# Patient Record
Sex: MALE | Race: WHITE | NOT HISPANIC OR LATINO | Employment: FULL TIME | URBAN - NONMETROPOLITAN AREA
[De-identification: names, ages, dates, MRNs, and addresses within clinical notes are randomized per-mention and may not be internally consistent; named-entity substitution may affect disease eponyms.]

---

## 2019-03-02 ENCOUNTER — HOSPITAL ENCOUNTER (INPATIENT)
Facility: HOSPITAL | Age: 59
LOS: 1 days | Discharge: LEFT BEFORE DISCHARGE | DRG: 871 | End: 2019-03-03
Attending: EMERGENCY MEDICINE | Admitting: FAMILY MEDICINE
Payer: COMMERCIAL

## 2019-03-02 ENCOUNTER — APPOINTMENT (EMERGENCY)
Dept: RADIOLOGY | Facility: HOSPITAL | Age: 59
DRG: 871 | End: 2019-03-02
Payer: COMMERCIAL

## 2019-03-02 DIAGNOSIS — D69.6 THROMBOCYTOPENIA (HCC): ICD-10-CM

## 2019-03-02 DIAGNOSIS — R50.9 FEVER: ICD-10-CM

## 2019-03-02 DIAGNOSIS — J18.9 PNEUMONIA: Primary | ICD-10-CM

## 2019-03-02 DIAGNOSIS — R53.1 WEAKNESS: ICD-10-CM

## 2019-03-02 DIAGNOSIS — R19.7 DIARRHEA: ICD-10-CM

## 2019-03-02 DIAGNOSIS — J18.9 COMMUNITY ACQUIRED PNEUMONIA OF LEFT UPPER LOBE OF LUNG: ICD-10-CM

## 2019-03-02 PROBLEM — I10 ESSENTIAL HYPERTENSION: Status: ACTIVE | Noted: 2019-03-02

## 2019-03-02 PROBLEM — A41.9 SEPSIS (HCC): Status: ACTIVE | Noted: 2019-03-02

## 2019-03-02 PROBLEM — F17.200 SMOKER: Status: ACTIVE | Noted: 2019-03-02

## 2019-03-02 LAB
ALBUMIN SERPL BCP-MCNC: 3.2 G/DL (ref 3.5–5)
ALP SERPL-CCNC: 62 U/L (ref 46–116)
ALT SERPL W P-5'-P-CCNC: 32 U/L (ref 12–78)
ANION GAP SERPL CALCULATED.3IONS-SCNC: 13 MMOL/L (ref 4–13)
AST SERPL W P-5'-P-CCNC: 23 U/L (ref 5–45)
BASOPHILS # BLD AUTO: 0.01 THOUSANDS/ΜL (ref 0–0.1)
BASOPHILS NFR BLD AUTO: 0 % (ref 0–1)
BILIRUB SERPL-MCNC: 0.4 MG/DL (ref 0.2–1)
BILIRUB UR QL STRIP: NEGATIVE
BUN SERPL-MCNC: 12 MG/DL (ref 5–25)
CALCIUM SERPL-MCNC: 8.8 MG/DL (ref 8.3–10.1)
CHLORIDE SERPL-SCNC: 98 MMOL/L (ref 100–108)
CLARITY UR: CLEAR
CO2 SERPL-SCNC: 23 MMOL/L (ref 21–32)
COLOR UR: YELLOW
CREAT SERPL-MCNC: 0.92 MG/DL (ref 0.6–1.3)
EOSINOPHIL # BLD AUTO: 0 THOUSAND/ΜL (ref 0–0.61)
EOSINOPHIL NFR BLD AUTO: 0 % (ref 0–6)
ERYTHROCYTE [DISTWIDTH] IN BLOOD BY AUTOMATED COUNT: 12.3 % (ref 11.6–15.1)
FLUAV AG SPEC QL: NOT DETECTED
FLUBV AG SPEC QL: NOT DETECTED
GFR SERPL CREATININE-BSD FRML MDRD: 92 ML/MIN/1.73SQ M
GLUCOSE SERPL-MCNC: 126 MG/DL (ref 65–140)
GLUCOSE UR STRIP-MCNC: NEGATIVE MG/DL
HCT VFR BLD AUTO: 48 % (ref 36.5–49.3)
HGB BLD-MCNC: 16.4 G/DL (ref 12–17)
HGB UR QL STRIP.AUTO: NEGATIVE
IMM GRANULOCYTES # BLD AUTO: 0.02 THOUSAND/UL (ref 0–0.2)
IMM GRANULOCYTES NFR BLD AUTO: 0 % (ref 0–2)
KETONES UR STRIP-MCNC: NEGATIVE MG/DL
LACTATE SERPL-SCNC: 1 MMOL/L (ref 0.5–2)
LEUKOCYTE ESTERASE UR QL STRIP: NEGATIVE
LIPASE SERPL-CCNC: 214 U/L (ref 73–393)
LYMPHOCYTES # BLD AUTO: 0.71 THOUSANDS/ΜL (ref 0.6–4.47)
LYMPHOCYTES NFR BLD AUTO: 10 % (ref 14–44)
MAGNESIUM SERPL-MCNC: 2 MG/DL (ref 1.6–2.6)
MCH RBC QN AUTO: 30.3 PG (ref 26.8–34.3)
MCHC RBC AUTO-ENTMCNC: 34.2 G/DL (ref 31.4–37.4)
MCV RBC AUTO: 89 FL (ref 82–98)
MONOCYTES # BLD AUTO: 0.47 THOUSAND/ΜL (ref 0.17–1.22)
MONOCYTES NFR BLD AUTO: 7 % (ref 4–12)
NEUTROPHILS # BLD AUTO: 5.65 THOUSANDS/ΜL (ref 1.85–7.62)
NEUTS SEG NFR BLD AUTO: 83 % (ref 43–75)
NITRITE UR QL STRIP: NEGATIVE
NRBC BLD AUTO-RTO: 0 /100 WBCS
PH UR STRIP.AUTO: 6.5 [PH] (ref 4.5–8)
PLATELET # BLD AUTO: 110 THOUSANDS/UL (ref 149–390)
PLATELET # BLD AUTO: 123 THOUSANDS/UL (ref 149–390)
PMV BLD AUTO: 10.6 FL (ref 8.9–12.7)
PMV BLD AUTO: 10.9 FL (ref 8.9–12.7)
POTASSIUM SERPL-SCNC: 4 MMOL/L (ref 3.5–5.3)
PROT SERPL-MCNC: 7.2 G/DL (ref 6.4–8.2)
PROT UR STRIP-MCNC: NEGATIVE MG/DL
RBC # BLD AUTO: 5.41 MILLION/UL (ref 3.88–5.62)
RSV B RNA SPEC QL NAA+PROBE: NOT DETECTED
SODIUM SERPL-SCNC: 134 MMOL/L (ref 136–145)
SP GR UR STRIP.AUTO: 1.01 (ref 1–1.03)
TROPONIN I SERPL-MCNC: <0.02 NG/ML
UROBILINOGEN UR QL STRIP.AUTO: 0.2 E.U./DL
WBC # BLD AUTO: 6.86 THOUSAND/UL (ref 4.31–10.16)

## 2019-03-02 PROCEDURE — 36415 COLL VENOUS BLD VENIPUNCTURE: CPT | Performed by: EMERGENCY MEDICINE

## 2019-03-02 PROCEDURE — 99285 EMERGENCY DEPT VISIT HI MDM: CPT

## 2019-03-02 PROCEDURE — 85049 AUTOMATED PLATELET COUNT: CPT | Performed by: FAMILY MEDICINE

## 2019-03-02 PROCEDURE — 84484 ASSAY OF TROPONIN QUANT: CPT | Performed by: EMERGENCY MEDICINE

## 2019-03-02 PROCEDURE — 83735 ASSAY OF MAGNESIUM: CPT | Performed by: EMERGENCY MEDICINE

## 2019-03-02 PROCEDURE — 86738 MYCOPLASMA ANTIBODY: CPT | Performed by: EMERGENCY MEDICINE

## 2019-03-02 PROCEDURE — 94640 AIRWAY INHALATION TREATMENT: CPT

## 2019-03-02 PROCEDURE — 96365 THER/PROPH/DIAG IV INF INIT: CPT

## 2019-03-02 PROCEDURE — 85025 COMPLETE CBC W/AUTO DIFF WBC: CPT | Performed by: EMERGENCY MEDICINE

## 2019-03-02 PROCEDURE — 84145 PROCALCITONIN (PCT): CPT | Performed by: FAMILY MEDICINE

## 2019-03-02 PROCEDURE — 94664 DEMO&/EVAL PT USE INHALER: CPT

## 2019-03-02 PROCEDURE — 84484 ASSAY OF TROPONIN QUANT: CPT | Performed by: FAMILY MEDICINE

## 2019-03-02 PROCEDURE — 93005 ELECTROCARDIOGRAM TRACING: CPT

## 2019-03-02 PROCEDURE — 83690 ASSAY OF LIPASE: CPT | Performed by: EMERGENCY MEDICINE

## 2019-03-02 PROCEDURE — 87631 RESP VIRUS 3-5 TARGETS: CPT | Performed by: EMERGENCY MEDICINE

## 2019-03-02 PROCEDURE — 94760 N-INVAS EAR/PLS OXIMETRY 1: CPT

## 2019-03-02 PROCEDURE — 80053 COMPREHEN METABOLIC PANEL: CPT | Performed by: EMERGENCY MEDICINE

## 2019-03-02 PROCEDURE — 87040 BLOOD CULTURE FOR BACTERIA: CPT | Performed by: EMERGENCY MEDICINE

## 2019-03-02 PROCEDURE — 99222 1ST HOSP IP/OBS MODERATE 55: CPT | Performed by: FAMILY MEDICINE

## 2019-03-02 PROCEDURE — 71046 X-RAY EXAM CHEST 2 VIEWS: CPT

## 2019-03-02 PROCEDURE — 81003 URINALYSIS AUTO W/O SCOPE: CPT | Performed by: FAMILY MEDICINE

## 2019-03-02 PROCEDURE — 87449 NOS EACH ORGANISM AG IA: CPT | Performed by: FAMILY MEDICINE

## 2019-03-02 PROCEDURE — 83605 ASSAY OF LACTIC ACID: CPT | Performed by: EMERGENCY MEDICINE

## 2019-03-02 RX ORDER — LEVALBUTEROL 1.25 MG/.5ML
1.25 SOLUTION, CONCENTRATE RESPIRATORY (INHALATION)
Status: DISCONTINUED | OUTPATIENT
Start: 2019-03-02 | End: 2019-03-03

## 2019-03-02 RX ORDER — SODIUM CHLORIDE 9 MG/ML
100 INJECTION, SOLUTION INTRAVENOUS CONTINUOUS
Status: DISCONTINUED | OUTPATIENT
Start: 2019-03-02 | End: 2019-03-03 | Stop reason: HOSPADM

## 2019-03-02 RX ORDER — IPRATROPIUM BROMIDE AND ALBUTEROL SULFATE 2.5; .5 MG/3ML; MG/3ML
3 SOLUTION RESPIRATORY (INHALATION) ONCE
Status: COMPLETED | OUTPATIENT
Start: 2019-03-02 | End: 2019-03-02

## 2019-03-02 RX ORDER — ALBUTEROL SULFATE 2.5 MG/3ML
2.5 SOLUTION RESPIRATORY (INHALATION) EVERY 6 HOURS PRN
Status: DISCONTINUED | OUTPATIENT
Start: 2019-03-02 | End: 2019-03-03 | Stop reason: HOSPADM

## 2019-03-02 RX ORDER — NICOTINE 21 MG/24HR
1 PATCH, TRANSDERMAL 24 HOURS TRANSDERMAL DAILY
Status: DISCONTINUED | OUTPATIENT
Start: 2019-03-02 | End: 2019-03-03 | Stop reason: HOSPADM

## 2019-03-02 RX ORDER — ALBUTEROL SULFATE 90 UG/1
2 AEROSOL, METERED RESPIRATORY (INHALATION)
COMMUNITY
Start: 2018-06-21

## 2019-03-02 RX ORDER — OXYCODONE HYDROCHLORIDE 5 MG/1
5 TABLET ORAL ONCE
Status: COMPLETED | OUTPATIENT
Start: 2019-03-02 | End: 2019-03-02

## 2019-03-02 RX ORDER — CEFTRIAXONE 1 G/50ML
1000 INJECTION, SOLUTION INTRAVENOUS EVERY 24 HOURS
Status: DISCONTINUED | OUTPATIENT
Start: 2019-03-02 | End: 2019-03-03 | Stop reason: HOSPADM

## 2019-03-02 RX ORDER — SODIUM CHLORIDE FOR INHALATION 0.9 %
3 VIAL, NEBULIZER (ML) INHALATION
Status: DISCONTINUED | OUTPATIENT
Start: 2019-03-02 | End: 2019-03-03

## 2019-03-02 RX ORDER — LOSARTAN POTASSIUM 50 MG/1
50 TABLET ORAL DAILY
Status: DISCONTINUED | OUTPATIENT
Start: 2019-03-03 | End: 2019-03-03 | Stop reason: HOSPADM

## 2019-03-02 RX ORDER — CEFAZOLIN SODIUM 2 G/50ML
2000 SOLUTION INTRAVENOUS ONCE
Status: COMPLETED | OUTPATIENT
Start: 2019-03-02 | End: 2019-03-02

## 2019-03-02 RX ORDER — OSELTAMIVIR PHOSPHATE 75 MG/1
75 CAPSULE ORAL EVERY 12 HOURS SCHEDULED
Status: DISCONTINUED | OUTPATIENT
Start: 2019-03-02 | End: 2019-03-03

## 2019-03-02 RX ORDER — ACETAMINOPHEN 325 MG/1
650 TABLET ORAL ONCE
Status: COMPLETED | OUTPATIENT
Start: 2019-03-02 | End: 2019-03-02

## 2019-03-02 RX ORDER — LOSARTAN POTASSIUM 50 MG/1
50 TABLET ORAL
COMMUNITY
Start: 2019-02-25

## 2019-03-02 RX ORDER — AZITHROMYCIN 250 MG/1
500 TABLET, FILM COATED ORAL EVERY 24 HOURS
Status: DISCONTINUED | OUTPATIENT
Start: 2019-03-03 | End: 2019-03-03 | Stop reason: HOSPADM

## 2019-03-02 RX ADMIN — SODIUM CHLORIDE 1000 ML: 0.9 INJECTION, SOLUTION INTRAVENOUS at 15:30

## 2019-03-02 RX ADMIN — OSELTAMIVIR PHOSPHATE 75 MG: 75 CAPSULE ORAL at 17:25

## 2019-03-02 RX ADMIN — OXYCODONE HYDROCHLORIDE 5 MG: 5 TABLET ORAL at 21:00

## 2019-03-02 RX ADMIN — ACETAMINOPHEN 650 MG: 325 TABLET, FILM COATED ORAL at 15:30

## 2019-03-02 RX ADMIN — SODIUM CHLORIDE 1000 ML: 0.9 INJECTION, SOLUTION INTRAVENOUS at 17:45

## 2019-03-02 RX ADMIN — IPRATROPIUM BROMIDE AND ALBUTEROL SULFATE 3 ML: 2.5; .5 SOLUTION RESPIRATORY (INHALATION) at 16:01

## 2019-03-02 RX ADMIN — SODIUM CHLORIDE 100 ML/HR: 0.9 INJECTION, SOLUTION INTRAVENOUS at 18:41

## 2019-03-02 RX ADMIN — LEVALBUTEROL 1.25 MG: 1.25 SOLUTION, CONCENTRATE RESPIRATORY (INHALATION) at 21:03

## 2019-03-02 RX ADMIN — AZITHROMYCIN MONOHYDRATE 500 MG: 500 INJECTION, POWDER, LYOPHILIZED, FOR SOLUTION INTRAVENOUS at 16:32

## 2019-03-02 RX ADMIN — CEFTRIAXONE 1000 MG: 1 INJECTION, SOLUTION INTRAVENOUS at 18:40

## 2019-03-02 RX ADMIN — CEFAZOLIN SODIUM 2000 MG: 2 SOLUTION INTRAVENOUS at 15:57

## 2019-03-02 RX ADMIN — ISODIUM CHLORIDE 3 ML: 0.03 SOLUTION RESPIRATORY (INHALATION) at 21:03

## 2019-03-02 NOTE — RESPIRATORY THERAPY NOTE
RT Protocol Note  [place] 63 y o  male MRN: [id number]  Unit/Bed#: 363-72 Encounter: [id number]    Assessment    Active Problems:    Sepsis ([facility] )    Community acquired pneumonia    Essential hypertension    Smoker    Thrombocytopenia ([facility] )      Home Pulmonary Medications:  Albuterol MDI PRN       Past Medical History:   Diagnosis Date    Asthma     COPD (chronic obstructive pulmonary disease) ([place] [address] )     Depression     Hyperlipidemia     Hypertension      Social History     Socioeconomic History    Marital status: Legally      Spouse name: None    Number of children: None    Years of education: None    Highest education level: None   Occupational History    None   Social Needs    Financial resource strain: None    Food insecurity:     Worry: None     Inability: None    Transportation needs:     Medical: None     Non-medical: None   Tobacco Use    Smoking status: Current Every Day Smoker     Packs/day: 0 25    Smokeless tobacco: Never Used   Substance and Sexual Activity    Alcohol use: Yes     Frequency: Monthly or less     Drinks per session: 1 or 2     Binge frequency: Less than monthly     Comment: occasionaly     Drug use: Never    Sexual activity: None   Lifestyle    Physical activity:     Days per week: None     Minutes per session: None    Stress: None   Relationships    Social connections:     Talks on phone: None     Gets together: None     Attends Shinto service: None     Active member of club or organization: None     Attends meetings of clubs or organizations: None     Relationship status: None    Intimate partner violence:     Fear of current or ex partner: None     Emotionally abused: None     Physically abused: None     Forced sexual activity: None   Other Topics Concern    None   Social History Narrative    None       Subjective         Objective    Physical Exam:   Assessment Type: Assess only  General Appearance: Awake, Alert  Respiratory Pattern: Dyspnea with exertion  Chest Assessment: Chest expansion symmetrical  Bilateral Breath Sounds: Diminished, Expiratory wheezes, Coarse    Vitals:  Blood pressure 122/73, pulse 90, temperature 98 °F (36 7 °C), temperature source Temporal, resp  rate 22, height 6' 1" (1 854 m), weight 91 kg (200 lb 9 9 oz), SpO2 98 %  Imaging and other studies: I have personally reviewed pertinent reports  Plan    Respiratory Plan: Home Bronchodilator Patient pathway          TID- 1 25/ NSS, Q6PRN Albuterol for SOB/ Wheezing

## 2019-03-02 NOTE — ED PROVIDER NOTES
History  Chief Complaint   Patient presents with    Flu Symptoms     started monday, flu like symptoms, body aches, fever, denies vomiting/nausea  had lorena  saw Family   this week      Patient is a 52-year-old male with a history of hypertension, COPD, hyperlipidemia coming in today with increasing fatigue, dry cough as well as subjective fevers  Patient states this all started on Monday when he was at work and left work  He reports that he has been taking medication as prescribed  He did follow up with the doctor and was told it was a viral syndrome  Patient reports that he does not have a measured temperature; however, has been taking Tylenol and Motrin to help relief with the body aches as well as fevers  He reports subjective fevers, chills, dry cough, myalgias, increasing weakness  He has decreased p o  Appetite due to lack of appetite  He has no abdominal pain, nausea, chest pain, vomiting, shortness of breath  He has not been using inhalers more frequently  He denies any hemoptysis, recent surgery, recent travel  He has no lower extremity edema  He has never had any issues with CHF        History provided by:  Patient   used: No    Fever - 9 weeks to 74 years   Temp source:  Subjective  Severity:  Moderate  Onset quality:  Gradual  Duration:  6 days  Timing:  Intermittent  Progression:  Waxing and waning  Chronicity:  New  Relieved by:  None tried  Worsened by:  Nothing  Ineffective treatments:  Acetaminophen  Associated symptoms: cough (dry) and myalgias    Associated symptoms: no chest pain, no chills, no confusion, no congestion, no diarrhea, no dysuria, no ear pain, no headaches, no nausea, no rash, no rhinorrhea, no somnolence, no sore throat and no vomiting    Associated symptoms comment:  Weakness    Risk factors: no contaminated food, no contaminated water, no hx of cancer, no immunosuppression, no occupational exposure, no recent sickness, no recent travel and no sick contacts        Prior to Admission Medications   Prescriptions Last Dose Informant Patient Reported? Taking? albuterol (PROAIR HFA) 90 mcg/act inhaler   Yes Yes   Sig: Inhale 2 puffs   losartan (COZAAR) 50 mg tablet   Yes Yes   Sig: Take 50 mg by mouth      Facility-Administered Medications: None       Past Medical History:   Diagnosis Date    Asthma     [facility]ch[facility]) [facility])     Depression     Hyperlipidemia     Hypertension        History reviewed  No pertinent surgical history  History reviewed  No pertinent family history  I have reviewed and agree with the history as documented  Social History     Tobacco Use    Smoking status: Current Every Day Smoker     Packs/day: 0 25    Smokeless tobacco: Never Used   Substance Use Topics    Alcohol use: Yes     Comment: occasionaly     Drug use: Never        Review of Systems   Constitutional: Positive for fever  Negative for chills and diaphoresis  HENT: Negative for congestion, ear pain, rhinorrhea and sore throat  Eyes: Negative for visual disturbance  Respiratory: Positive for cough (dry)  Negative for chest tightness and shortness of breath  Cardiovascular: Negative for chest pain and palpitations  Gastrointestinal: Negative for abdominal pain, diarrhea, nausea and vomiting  Genitourinary: Negative for difficulty urinating and dysuria  Musculoskeletal: Positive for myalgias  Negative for back pain and neck pain  Skin: Negative for rash  Neurological: Positive for weakness  Negative for headaches  Psychiatric/Behavioral: Negative for confusion  All other systems reviewed and are negative  Physical Exam  Physical Exam   Constitutional: He is oriented to person, place, and time  He appears well-developed and well-nourished  No distress  Appears older then stated age    HENT:   Head: Normocephalic and atraumatic     Right Ear: Hearing, tympanic membrane, external ear and ear canal normal  Left Ear: Hearing, tympanic membrane, external ear and ear canal normal    Nose: Nose normal    Mouth/Throat: Uvula is midline  Mucous membranes are dry  Patient maintaining airway maintaining secretions  Uvula midline without edema  Dry mucous membranes  Eyes: Pupils are equal, round, and reactive to light  Conjunctivae and EOM are normal    Neck: Normal range of motion  Neck supple  Cardiovascular: Regular rhythm, normal heart sounds and intact distal pulses  Tachycardia present  No murmur heard  Pulses:       Radial pulses are 2+ on the right side, and 2+ on the left side  Dorsalis pedis pulses are 2+ on the right side, and 2+ on the left side  Pulmonary/Chest: Effort normal  No stridor  No respiratory distress  He has wheezes in the right lower field and the left lower field  He has rhonchi in the left lower field  Abdominal: Soft  Bowel sounds are normal  He exhibits no distension  There is no tenderness  There is no rigidity, no rebound, no guarding, no CVA tenderness, no tenderness at McBurney's point and negative Sutton's sign  Musculoskeletal: Normal range of motion  He exhibits no edema  Patient moves bilateral upper extremities and lower extremities independently and pain-free  Neurological: He is alert and oriented to person, place, and time  He has normal strength  He displays no tremor  No cranial nerve deficit or sensory deficit  He exhibits normal muscle tone  He displays no seizure activity  Coordination normal  GCS eye subscore is 4  GCS verbal subscore is 5  GCS motor subscore is 6  Skin: Skin is warm  Capillary refill takes less than 2 seconds  He is not diaphoretic  Nursing note and vitals reviewed        Vital Signs  ED Triage Vitals [03/02/19 1510]   Temperature Pulse Respirations Blood Pressure SpO2   (!) 100 7 °F (38 2 °C) (!) 113 22 136/88 96 %      Temp Source Heart Rate Source Patient Position - Orthostatic VS BP Location FiO2 (%)   Temporal Monitor Lying Left arm --      Pain Score       6           Vitals:    03/02/19 1510   BP: 136/88   Pulse: (!) 113   Patient Position - Orthostatic VS: Lying       Visual Acuity      ED Medications  Medications   sodium chloride 0 9 % bolus 1,000 mL (has no administration in time range)   azithromycin (ZITHROMAX) 500 mg in sodium chloride 0 9 % 250 mL IVPB (500 mg Intravenous New Bag 3/2/19 1632)   acetaminophen (TYLENOL) tablet 650 mg (650 mg Oral Given 3/2/19 1530)   sodium chloride 0 9 % bolus 1,000 mL (1,000 mL Intravenous New Bag 3/2/19 1530)   sodium chloride 0 9 % bolus 1,000 mL (1,000 mL Intravenous New Bag 3/2/19 1530)   ceFAZolin (ANCEF) IVPB (premix) 2,000 mg (0 mg Intravenous Stopped 3/2/19 1631)   ipratropium-albuterol (DUO-NEB) 0 5-2 5 mg/3 mL inhalation solution 3 mL (3 mL Nebulization Given 3/2/19 1601)       Diagnostic Studies  Results Reviewed     Procedure Component Value Units Date/Time    Lactic acid, plasma [[id number]]  (Normal) Collected:  03/02/19 1533    Lab Status:  Final result Specimen:  Blood from Arm, Right Updated:  03/02/19 1605     LACTIC ACID 1 0 mmol/L     Narrative:       Result may be elevated if tourniquet was used during collection      Troponin I [[id number]]  (Normal) Collected:  03/02/19 1528    Lab Status:  Final result Specimen:  Blood from Arm, Right Updated:  03/02/19 1602     Troponin I <0 02 ng/mL     Comprehensive metabolic panel [[id number]]  (Abnormal) Collected:  03/02/19 1528    Lab Status:  Final result Specimen:  Blood from Arm, Right Updated:  03/02/19 1601     Sodium 134 mmol/L      Potassium 4 0 mmol/L      Chloride 98 mmol/L      CO2 23 mmol/L      ANION GAP 13 mmol/L      BUN 12 mg/dL      Creatinine 0 92 mg/dL      Glucose 126 mg/dL      Calcium 8 8 mg/dL      AST 23 U/L      ALT 32 U/L      Alkaline Phosphatase 62 U/L      Total Protein 7 2 g/dL      Albumin 3 2 g/dL      Total Bilirubin 0 40 mg/dL      eGFR 92 ml/min/1 73sq m     Narrative:       National Kidney Disease Education Program recommendations are as follows:  GFR calculation is accurate only with a steady state creatinine  Chronic Kidney disease less than 60 ml/min/1 73 sq  meters  Kidney failure less than 15 ml/min/1 73 sq  meters  Lipase [[id number]]  (Normal) Collected:  03/02/19 1528    Lab Status:  Final result Specimen:  Blood from Arm, Right Updated:  03/02/19 1601     Lipase 214 u/L     Magnesium [[id number]]  (Normal) Collected:  03/02/19 1528    Lab Status:  Final result Specimen:  Blood from Arm, Right Updated:  03/02/19 1601     Magnesium 2 0 mg/dL     Mycoplasma Pneumoniae AB, IgG/IgM [[id number]] Collected:  03/02/19 1556    Lab Status: In process Specimen:  Blood from Arm, Left Updated:  03/02/19 1559    Strep Pneumoniae, Urine [[id number]]     Lab Status:  No result Specimen:  Urine, Clean Catch     Legionella antigen, urine [[id number]]     Lab Status:  No result Specimen:  Urine, Clean Catch     CBC and differential [[id number]]  (Abnormal) Collected:  03/02/19 1528    Lab Status:  Final result Specimen:  Blood from Arm, Right Updated:  03/02/19 1543     WBC 6 86 Thousand/uL      RBC 5 41 Million/uL      Hemoglobin 16 4 g/dL      Hematocrit 48 0 %      MCV 89 fL      MCH 30 3 pg      MCHC 34 2 g/dL      RDW 12 3 %      MPV 10 6 fL      Platelets 753 Thousands/uL      nRBC 0 /100 WBCs      Neutrophils Relative 83 %      Immat GRANS % 0 %      Lymphocytes Relative 10 %      Monocytes Relative 7 %      Eosinophils Relative 0 %      Basophils Relative 0 %      Neutrophils Absolute 5 65 Thousands/µL      Immature Grans Absolute 0 02 Thousand/uL      Lymphocytes Absolute 0 71 Thousands/µL      Monocytes Absolute 0 47 Thousand/µL      Eosinophils Absolute 0 00 Thousand/µL      Basophils Absolute 0 01 Thousands/µL     Influenza A/B and RSV by PCR [[id number]] Collected:  03/02/19 1537    Lab Status:   In process Specimen:  Nasopharyngeal Updated:  03/02/19 1537    Blood culture #1 [[id number]] Collected:  03/02/19 1528    Lab Status: In process Specimen:  Blood from Arm, Left Updated:  03/02/19 1535    Blood culture #2 [[id number]] Collected:  03/02/19 1528    Lab Status: In process Specimen:  Blood from Arm, Right Updated:  03/02/19 1535    UA w Reflex to Microscopic [[id number]]     Lab Status:  No result Specimen:  Urine                  XR chest 2 views   Final Result by Riddhi Waller MD (03/02 1601)      Peripheral nodular left upper lobe infiltrate concerning for pneumonia  Follow-up recommended  Workstation performed: [id number]                    Procedures  Procedures       Phone Contacts  ED Phone Contact    ED Course         HEART Risk Score      Most Recent Value   History  0 Filed at: 03/02/2019 1603   ECG  1 Filed at: 03/02/2019 1603   Age  1 Filed at: 03/02/2019 1603   Risk Factors  2 Filed at: 03/02/2019 1603   Troponin  0 Filed at: 03/02/2019 1603   Heart Score Risk Calculator   History  0 Filed at: 03/02/2019 1603   ECG  1 Filed at: 03/02/2019 1603   Age  1 Filed at: 03/02/2019 1603   Risk Factors  2 Filed at: 03/02/2019 1603   Troponin  0 Filed at: 03/02/2019 1603   HEART Score  4 Filed at: 03/02/2019 1603   HEART Score  4 Filed at: 03/02/2019 1603                            MDM  Number of Diagnoses or Management Options  Fever:   Pneumonia:   Weakness:   Diagnosis management comments:   Patient is a 67-year-old male coming in today with multiple complaints lateral consistent with infectious etiology  On exam patient does appear dry and does appear febrile  He is tachycardic but hemodynamically stable otherwise  Will obtain basic labs, EKG, chest x-ray as well as give 30 per cc bolus IV fluids  Will also obtain flu swab and chest x-ray  Patient agreeable plan of care  Will give Tylenol at this time  Patient did have Motrin approximately 2 hours before coming to the ER  In review of chart, patient was [facility]  Patient refused flu swab at that time  EKG INTERPRETATION at 3:23 p m  RHYTHM:  Sinus tachycardia 100 beats per minute  AXIS:  Normal axis  INTERVALS:  WV interval measured at 156 milliseconds  QRS COMPLEX:  QRS measured at 88 milliseconds  ST SEGMENT:  Nonspecific ST segment changes  Diffuse artifact  QT INTERVAL:  QTC measured at 413 milliseconds  COMPARED WITH PRIOR   [place] Interpretation by Juventino Snyder DO    3:49 PM  Chest xray noted with large left sided pneumonia  Will obtain blood cultures, as well as mycoplasma culture, strep pneumonia culture as well as Legionella  Will start Ancef and azithromycin for community-acquired pneumonia  Patient updated on this  He is agreeable  He states he did receive a pneumonia shot prior  4:02 PM  No evidence of leukocytosis  Thrombocytopenia is new as well as mild hyponatremia  Otherwise no acute kidney injury, anemia or electrolyte dysfunction  Legionella sent  Patient's platelets were stable compared to old in chart  4:17 PM  Patient updated on plan  Mild decrease in tachycardia  Patient states he is able to urinate  But he states he has been having diarrhea for the past 1-2 days  Concerning for Legionella  Azithromycin ordered  Appears remains alert oriented x3, cranial nerves 2-12 grossly intact  No focal deficits  Tachycardic  No respiratory distress  Continue wheezing and rhonchi left greater than right  Discussed with Dr Steph Milian  We had a detailed discussion of the patient's condition and case,  including need for admission  Accepts to his/her service  Bed request/bridging orders placed  Portions of the record may have been created with voice recognition software  Occasional wrong word or "sound a like" substitutions may have occurred due to the inherent limitations of voice recognition software  Read the chart carefully and recognize, using context, where substitutions have occurred           Amount and/or Complexity of Data Reviewed  Clinical lab tests: ordered and reviewed  Tests in the radiology section of CPT®: ordered and reviewed  Tests in the medicine section of CPT®: ordered and reviewed  Independent visualization of images, tracings, or specimens: yes (Large left-sided pneumonia  Cardiac silhouette stable  No pneumothoraces)        Disposition  Final diagnoses:   Pneumonia   Fever   Weakness   Diarrhea   Thrombocytopenia ([facility] )     Time reflects when diagnosis was documented in both MDM as applicable and the Disposition within this note     Time User Action Codes Description Comment    3/2/2019  3:56 PM Chantal Jonas Add [J18 9] Pneumonia     3/2/2019  3:56 PM Bendock, Naveen Bolus L Add [R50 9] Fever     3/2/2019  3:56 PM Bendock, Naveen Bolus L Add [R53 1] Weakness     3/2/2019  4:18 PM Bendzack, Domi L Add [R19 7] Diarrhea     3/2/2019  4:18 PM Amparo Tidwell Add [D69 6] Thrombocytopenia [facility])       ED Disposition     ED Disposition Condition Date/Time Comment    Admit Stable Sat Mar 2, 2019  4:17 PM Case was discussed with Dr Hakeem Sanchez and the patient's admission status was agreed to be Admission Status: inpatient status to the service of Dr Hakeem Sanchez   Follow-up Information    None         Patient's Medications   Discharge Prescriptions    No medications on file     No discharge procedures on file      ED Provider  Electronically Signed by           Naseem Leonardo, DO  03/02/19 [address], DO  03/02/19 9615

## 2019-03-02 NOTE — H&P
H&P Exam - Marciano Mehta 63 y o  male MRN: [id number]    Unit/Bed#: RM06 Encounter: [id number]      Sepsis [facility])  Assessment & Plan  Present on admission  Evidenced by heart rate and temperature  See treatment plan for community-acquired pneumonia    Community acquired pneumonia  Assessment & Plan  Blood cultures obtained x2 in the emergency room  Follow-up of a screen   Check urine Legionella and pneumococcal antigen  Check sputum culture  Check procalcitonin now and tomorrow morning  Check troponin x2  Initiate Rocephin and azithromycin  Initiate Tamiflu until influenza screen results      Thrombocytopenia (HCC)  Assessment & Plan  Monitor platelet counts closely     Smoker  Assessment & Plan  7 mg nicotine patch    Essential hypertension  Assessment & Plan  Hold losartan for 24 hours in the setting of acute illness          History of Present Illness      The patient is a pleasant 63 male with past medical history significant for hypertension presents to emergency stated she complaint of generalized malaise, fatigue, nonproductive cough, fevers as high as 101 2 ongoing since Monday  Patient states that he left work early on Monday due to a high fever and generalized malaise  Since then he attempted to rest but feels that he is getting worse  He denies any chest pain or shortness of breath  His cough is nonproductive and there are no sick contacts  He states he has been nauseated but denies any vomiting  He has had 2 loose bowel movements  He is able to keep food down but has no appetite  Patient received his influenza and pneumococcal vaccination this year  smoke 3-4 cigarettes per day       Review of Systems   Constitutional: Positive for fatigue and fever  HENT: Negative  Respiratory: Positive for cough  Cardiovascular: Negative for chest pain  Gastrointestinal: Positive for diarrhea  Neurological: Positive for weakness  All other systems reviewed and are negative        Historical Information   Past Medical History:   Diagnosis Date    Asthma     [facility]ronic obstructive [facility]) [facility] )     Depression     Hyperlipidemia     Hypertension      History reviewed  No pertinent surgical history  Social History   Social History     Substance and Sexual Activity   Alcohol Use Yes    Comment: occasionaly      Social History     Substance and Sexual Activity   Drug Use Never     Social History     Tobacco Use   Smoking Status Current Every Day Smoker    Packs/day: 0 25   Smokeless Tobacco Never Used     Family History: non-contributory    Meds/Allergies   all medications and allergies reviewed  No Known Allergies    Objective   First Vitals:   Blood Pressure: 136/88 (03/02/19 1510)  Pulse: (!) 113 (03/02/19 1510)  Temperature: (!) 100 7 °F (38 2 °C) (03/02/19 1510)  Temp Source: Temporal (03/02/19 1510)  Respirations: 22 (03/02/19 1510)  Height: 6' 1" (185 4 cm) (03/02/19 1510)  Weight - Scale: 92 kg (202 lb 13 2 oz) (03/02/19 1510)  SpO2: 96 % (03/02/19 1510)    Current Vitals:   Blood Pressure: 130/77 (03/02/19 1630)  Pulse: (!) 106 (03/02/19 1630)  Temperature: 99 °F (37 2 °C) (03/02/19 1632)  Temp Source: Temporal (03/02/19 1632)  Respirations: 20 (03/02/19 1630)  Height: 6' 1" (185 4 cm) (03/02/19 1510)  Weight - Scale: 92 kg (202 lb 13 2 oz) (03/02/19 1510)  SpO2: 94 % (03/02/19 1630)      Intake/Output Summary (Last 24 hours) at 3/2/2019 1641  Last data filed at 3/2/2019 1631  Gross per 24 hour   Intake 56 67 ml   Output --   Net 56 67 ml       Invasive Devices     Peripheral Intravenous Line            Peripheral IV 03/02/19 Right Antecubital less than 1 day                Physical Exam   Constitutional: He appears well-developed  No distress  HENT:   Head: Normocephalic and atraumatic  Eyes: Pupils are equal, round, and reactive to light  No scleral icterus  Neck: Neck supple  No JVD present  Cardiovascular: Regular rhythm     Tachycardic   Pulmonary/Chest: Effort normal and breath sounds normal  No stridor  No respiratory distress  Abdominal: Soft  Bowel sounds are normal  He exhibits no distension  There is no tenderness  There is no guarding  Musculoskeletal: Normal range of motion  He exhibits no edema  Neurological: He is alert  No cranial nerve deficit  Coordination normal    Skin: Skin is warm  Capillary refill takes 2 to 3 seconds  He is not diaphoretic  No erythema  Lab Results:   Lab Results   Component Value Date    WBC 6 86 03/02/2019    HGB 16 4 03/02/2019    HCT 48 0 03/02/2019    MCV 89 03/02/2019     (L) 03/02/2019     Lab Results   Component Value Date    CALCIUM 8 8 03/02/2019    K 4 0 03/02/2019    CO2 23 03/02/2019    CL 98 (L) 03/02/2019    BUN 12 03/02/2019    CREATININE 0 92 03/02/2019       Imaging:   XR chest 2 views   Final Result      Peripheral nodular left upper lobe infiltrate concerning for pneumonia  Follow-up recommended              Workstation performed: [id number]             EKG, Pathology, and Other Studies:   Sinus tACH    Code Status: No Order  Advance Directive and Living Will:      Power of :    POLST:      Counseling / Coordination of Care:

## 2019-03-02 NOTE — ASSESSMENT & PLAN NOTE
Present on admission  Evidenced by heart rate and temperature  See treatment plan for community-acquired pneumonia

## 2019-03-02 NOTE — ASSESSMENT & PLAN NOTE
Blood cultures obtained x2 in the emergency room  Follow-up of a screen   Check urine Legionella and pneumococcal antigen  Check sputum culture  Check procalcitonin now and tomorrow morning  Check troponin x2  Initiate Rocephin and azithromycin  Initiate Tamiflu until influenza screen results

## 2019-03-03 VITALS
SYSTOLIC BLOOD PRESSURE: 118 MMHG | HEART RATE: 98 BPM | HEIGHT: 73 IN | TEMPERATURE: 100 F | WEIGHT: 200.62 LBS | OXYGEN SATURATION: 94 % | BODY MASS INDEX: 26.59 KG/M2 | RESPIRATION RATE: 20 BRPM | DIASTOLIC BLOOD PRESSURE: 75 MMHG

## 2019-03-03 LAB
ANION GAP SERPL CALCULATED.3IONS-SCNC: 11 MMOL/L (ref 4–13)
BUN SERPL-MCNC: 9 MG/DL (ref 5–25)
CALCIUM SERPL-MCNC: 7.9 MG/DL (ref 8.3–10.1)
CHLORIDE SERPL-SCNC: 102 MMOL/L (ref 100–108)
CO2 SERPL-SCNC: 21 MMOL/L (ref 21–32)
CREAT SERPL-MCNC: 0.77 MG/DL (ref 0.6–1.3)
ERYTHROCYTE [DISTWIDTH] IN BLOOD BY AUTOMATED COUNT: 12.4 % (ref 11.6–15.1)
GFR SERPL CREATININE-BSD FRML MDRD: 101 ML/MIN/1.73SQ M
GLUCOSE SERPL-MCNC: 92 MG/DL (ref 65–140)
HCT VFR BLD AUTO: 42.1 % (ref 36.5–49.3)
HGB BLD-MCNC: 14.1 G/DL (ref 12–17)
L PNEUMO1 AG UR QL IA.RAPID: NEGATIVE
MAGNESIUM SERPL-MCNC: 1.7 MG/DL (ref 1.6–2.6)
MCH RBC QN AUTO: 30.1 PG (ref 26.8–34.3)
MCHC RBC AUTO-ENTMCNC: 33.5 G/DL (ref 31.4–37.4)
MCV RBC AUTO: 90 FL (ref 82–98)
PLATELET # BLD AUTO: 118 THOUSANDS/UL (ref 149–390)
PMV BLD AUTO: 11.3 FL (ref 8.9–12.7)
POTASSIUM SERPL-SCNC: 3.9 MMOL/L (ref 3.5–5.3)
PROCALCITONIN SERPL-MCNC: 0.05 NG/ML
PROCALCITONIN SERPL-MCNC: <0.05 NG/ML
RBC # BLD AUTO: 4.68 MILLION/UL (ref 3.88–5.62)
S PNEUM AG UR QL: NEGATIVE
SODIUM SERPL-SCNC: 134 MMOL/L (ref 136–145)
WBC # BLD AUTO: 5.56 THOUSAND/UL (ref 4.31–10.16)

## 2019-03-03 PROCEDURE — 99238 HOSP IP/OBS DSCHRG MGMT 30/<: CPT | Performed by: FAMILY MEDICINE

## 2019-03-03 PROCEDURE — 84145 PROCALCITONIN (PCT): CPT | Performed by: FAMILY MEDICINE

## 2019-03-03 PROCEDURE — 94760 N-INVAS EAR/PLS OXIMETRY 1: CPT

## 2019-03-03 PROCEDURE — 80048 BASIC METABOLIC PNL TOTAL CA: CPT | Performed by: FAMILY MEDICINE

## 2019-03-03 PROCEDURE — 94640 AIRWAY INHALATION TREATMENT: CPT

## 2019-03-03 PROCEDURE — 83735 ASSAY OF MAGNESIUM: CPT | Performed by: FAMILY MEDICINE

## 2019-03-03 PROCEDURE — 85027 COMPLETE CBC AUTOMATED: CPT | Performed by: FAMILY MEDICINE

## 2019-03-03 RX ORDER — ACETAMINOPHEN 325 MG/1
650 TABLET ORAL ONCE
Status: COMPLETED | OUTPATIENT
Start: 2019-03-03 | End: 2019-03-03

## 2019-03-03 RX ORDER — ALBUTEROL SULFATE 90 UG/1
2 AEROSOL, METERED RESPIRATORY (INHALATION) EVERY 4 HOURS PRN
Status: DISCONTINUED | OUTPATIENT
Start: 2019-03-03 | End: 2019-03-03 | Stop reason: HOSPADM

## 2019-03-03 RX ORDER — CEFUROXIME AXETIL 500 MG/1
500 TABLET ORAL EVERY 12 HOURS SCHEDULED
Qty: 10 TABLET | Refills: 0 | Status: SHIPPED | OUTPATIENT
Start: 2019-03-03 | End: 2019-03-08

## 2019-03-03 RX ORDER — METHYLPREDNISOLONE 4 MG/1
TABLET ORAL
Qty: 1 EACH | Refills: 0 | Status: SHIPPED | OUTPATIENT
Start: 2019-03-03 | End: 2021-10-23 | Stop reason: ALTCHOICE

## 2019-03-03 RX ADMIN — ALBUTEROL SULFATE 2.5 MG: 2.5 SOLUTION RESPIRATORY (INHALATION) at 02:02

## 2019-03-03 RX ADMIN — SODIUM CHLORIDE 100 ML/HR: 0.9 INJECTION, SOLUTION INTRAVENOUS at 05:46

## 2019-03-03 RX ADMIN — ACETAMINOPHEN 650 MG: 325 TABLET, FILM COATED ORAL at 05:58

## 2019-03-03 NOTE — NURSING NOTE
Went into patients room, he was sitting on the side of the bed eating breakfast  Stated, "How about you get this out of my arm, and I go home " I asked him if he was sure he wanted to sign out AMA and he said yes  Called Dr Cait Vasques, had patient sign paper  Pt very dyspneic and wheezing  Reviewed AVS with patient

## 2019-03-03 NOTE — DISCHARGE SUMMARY
Discharge Summary - Lissett Levin 63 y o  male MRN: [id number]    Unit/Bed#: 556-58 Encounter: [id number]    Admission Date:   Admission Orders (From admission, onward)    Ordered        03/02/19 1617  Inpatient Admission (expected length of stay for this patient Order details is greater than two midnights)  Once     Order ID Start Status   [id number] 03/02/19 1618 Completed                Admitting Diagnosis: Diarrhea [R19 7]  Pneumonia [J18 9]  Weakness [R53 1]  Thrombocytopenia (HCC) [D69 6]  Fever [R50 9]  Flu-like symptoms [R68 89]    HPI: The patient is a pleasant 63 male with past medical history significant for hypertension presents to emergency stated she complaint of generalized malaise, fatigue, nonproductive cough, fevers as high as 101 2 ongoing since Monday  Patient states that he left work early on Monday due to a high fever and generalized malaise  Since then he attempted to rest but feels that he is getting worse  He denies any chest pain or shortness of breath  His cough is nonproductive and there are no sick contacts  He states he has been nauseated but denies any vomiting  He has had 2 loose bowel movements  He is able to keep food down but has no appetite  Patient received his influenza and pneumococcal vaccination this year  smoke 3-4 cigarettes per day         Procedures Performed: No orders of the defined types were placed in this encounter  Summary of Hospital Course:     Sepsis present on admission secondary to community-acquired pneumonia  Patient admitted to the medical floor, he received the appropriate 30 cc/kg bolus, initiated on Rocephin and azithromycin, urine Legionella, pneumococcal antigen along with 2 sets of blood cultures were obtained in the emergency room  Patient was started on IV fluids overnight and losartan was held in the setting of acute illness      18 hours after admission the patient stated that we are doing nothing for him, he stated he felt better and can take care of himself at home  He wanted his albuterol meter dose inhaler at bedside  Blood cultures did not result and he elected to sign out against medical advice  I sent him home on Ceftin and a Medrol Dosepak and encouraged to follow up with his PCP within 1-2 days of discharge  Significant Findings, Care, Treatment and Services Provided:       Complications: none    Discharge Diagnosis: see above        Condition at Discharge: fair         Discharge instructions/Information to patient and family:   See after visit summary for information provided to patient and family  Provisions for Follow-Up Care:  See after visit summary for information related to follow-up care and any pertinent home health orders  PCP: Karyle Lemon, MD    Disposition: Left against medical advice    Planned Readmission: No    Discharge Statement   I spent 40 minutes discharging the patient  This time was spent on the day of discharge  I had direct contact with the patient on the day of discharge  Additional documentation is required if more than 30 minutes were spent on discharge  Discharge Medications:  See after visit summary for reconciled discharge medications provided to patient and family

## 2019-03-03 NOTE — UTILIZATION REVIEW
Initial Clinical Review    Admission: Date/Time/Statement: 3/2/19 @ 1617   Orders Placed This Encounter   Procedures    Inpatient Admission (expected length of stay for this patient Order details is greater than two midnights)     Standing Status:   Standing     Number of Occurrences:   1     Order Specific Question:   Admitting Physician     Answer:   Bella Marquez     Order Specific Question:   Level of Care     Answer:   Med Surg [16]     Order Specific Question:   Estimated length of stay     Answer:   More than 2 Midnights     Order Specific Question:   Certification     Answer:   I certify that inpatient services are medically necessary for this patient for a duration of greater than two midnights  See H&P and MD Progress Notes for additional information about the patient's course of treatment  ED: Date/Time/Mode of Arrival:   ED Arrival Information     Expected Arrival Acuity Means of Arrival Escorted By Service Admission Type    - 3/2/2019 15:05 Urgent Walk-In Family Member General Medicine Urgent    Arrival Complaint    FLU SYMPTOMS        Chief Complaint:   Chief Complaint   Patient presents with    Flu Symptoms     started monday, flu like symptoms, body aches, fever, denies vomiting/nausea  had lorena  saw Family dr  this week      History of Illness: The patient is a pleasant 63 male with past medical history significant for hypertension presents to emergency stated she complaint of generalized malaise, fatigue, nonproductive cough, fevers as high as 101 2 ongoing since Monday  Patient states that he left work early on Monday due to a high fever and generalized malaise  Since then he attempted to rest but feels that he is getting worse  He denies any chest pain or shortness of breath  His cough is nonproductive and there are no sick contacts  He states he has been nauseated but denies any vomiting  He has had 2 loose bowel movements    He is able to keep food down but has no appetite Patient received his influenza and pneumococcal vaccination this year  smoke 3-4 cigarettes per day         ED Vital Signs:   ED Triage Vitals [03/02/19 1510]   Temperature Pulse Respirations Blood Pressure SpO2   (!) 100 7 °F (38 2 °C) (!) 113 22 136/88 96 %      Temp Source Heart Rate Source Patient Position - Orthostatic VS BP Location FiO2 (%)   Temporal Monitor Lying Left arm --      Pain Score       6        Wt Readings from Last 1 Encounters:   03/02/19 91 kg (200 lb 9 9 oz)     Vital Signs (abnormal):    above    Pertinent Labs/Diagnostic Test Results:   NA  134  Chloride  98  Albumin  3 2  Platelets  176  CXR:    Peripheral nodular left upper lobe infiltrate concerning for pneumonia   Follow-up recommended  ED Treatment:   Medication Administration from 03/02/2019 1501 to 03/02/2019 1700       Date/Time Order Dose Route Action Action by Comments     03/02/2019 1530 acetaminophen (TYLENOL) tablet 650 mg 650 mg Oral Given Carol Timmons RN      03/02/2019 1530 sodium chloride 0 9 % bolus 1,000 mL 1,000 mL Intravenous Gartnervmiriamet 37 Carol Timmons RN      03/02/2019 1530 sodium chloride 0 9 % bolus 1,000 mL 1,000 mL Intravenous Gartnertoluet 37 Carol Timmons RN      03/02/2019 1631 ceFAZolin (ANCEF) IVPB (premix) 2,000 mg 0 mg Intravenous Stopped Carol Timmons RN      03/02/2019 1557 ceFAZolin (ANCEF) IVPB (premix) 2,000 mg 2,000 mg Intravenous Gartnervmiriamet 37 Carol Timmons RN      03/02/2019 1632 azithromycin (ZITHROMAX) 500 mg in sodium chloride 0 9 % 250 mL IVPB 500 mg Intravenous Litotnervmiriamet 37 Carol Timmons RN      03/02/2019 1601 ipratropium-albuterol (DUO-NEB) 0 5-2 5 mg/3 mL inhalation solution 3 mL 3 mL Nebulization Given Carol Timmons RN         Past Medical/Surgical History:    Active Ambulatory Problems     Diagnosis Date Noted    No Active Ambulatory Problems     Resolved Ambulatory Problems     Diagnosis Date Noted    No Resolved Ambulatory Problems     Past Medical History: Diagnosis Date    Asthma     COPD (chronic obstructive pulmonary disease) ([facility] )     Depression     Hyperlipidemia     Hypertension      Admitting Diagnosis: Diarrhea [R19 7]  Pneumonia [J18 9]  Weakness [R53 1]  Thrombocytopenia (HCC) [D69 6]  Fever [R50 9]  Flu-like symptoms [R68 89]  Age/Sex: 63 y o  male     Assessment/Plan: Sepsis ([facility] )  Assessment & Plan  Present on admission  Evidenced by heart rate and temperature  See treatment plan for community-acquired pneumonia     Community acquired pneumonia  Assessment & Plan  Blood cultures obtained x2 in the emergency room  Follow-up of a screen   Check urine Legionella and pneumococcal antigen  Check sputum culture  Check procalcitonin now and tomorrow morning  Check troponin x2  Initiate Rocephin and azithromycin  Initiate Tamiflu until influenza screen results        Thrombocytopenia (HCC)  Assessment & Plan  Monitor platelet counts closely      Smoker  Assessment & Plan  7 mg nicotine patch    Essential hypertension  Assessment & Plan  Hold losartan for 24 hours in the setting of acute illness        Admission Orders:   IP  3/2  @    1618  Scheduled Meds:   Current Facility-Administered Medications:  albuterol 2 puff Inhalation Q4H PRN Mulugeta Sepulveda MD    albuterol 2 5 mg Nebulization Q6H PRN Mulugeta Sepulveda MD    azithromycin 500 mg Oral Q24H Mulugeta Sepulveda MD    cefTRIAXone 1,000 mg Intravenous Q24H Mulugeta Sepulveda MD Last Rate: Stopped (03/02/19 1910)   enoxaparin 40 mg Subcutaneous Daily Mulugeta eSpulveda MD    losartan 50 mg Oral Daily Mulugeta Sepulveda MD    nicotine 1 patch Transdermal Daily Kareem Hopper MD    sodium chloride 100 mL/hr Intravenous Continuous Mulugeta Sepulveda MD Last Rate: 100 mL/hr (03/03/19 0546)     Continuous Infusions:   sodium chloride 100 mL/hr Last Rate: 100 mL/hr (03/03/19 0546)     PRN Meds:   albuterol    albuterol     Reg diet  Sputum c/s    Per  D/c  Summary   3/3  Sepsis present on admission secondary to community-acquired pneumonia  Patient admitted to the medical floor, he received the appropriate 30 cc/kg bolus, initiated on Rocephin and azithromycin, urine Legionella, pneumococcal antigen along with 2 sets of blood cultures were obtained in the emergency room  Patient was started on IV fluids overnight and losartan was held in the setting of acute illness      18 hours after admission the patient stated that we are doing nothing for him, he stated he felt better and can take care of himself at home  He wanted his albuterol meter dose inhaler at bedside  Blood cultures did not result and he elected to sign out against medical advice  I sent him home on Ceftin and a Medrol Dosepak and encouraged to follow up with his PCP within 1-2 days of discharge  Network Utilization Review Department  Phone: [phone]; Fax [phone]  [email or web address]  ATTENTION: Please call with any questions or concerns to [phone]  and carefully listen to the prompts so that you are directed to the right person  Send all requests for admission clinical reviews, approved or denied determinations and any other requests to fax [phone]   All voicemails are confidential

## 2019-03-04 LAB
ATRIAL RATE: 103 BPM
P AXIS: 63 DEGREES
PR INTERVAL: 156 MS
QRS AXIS: 83 DEGREES
QRSD INTERVAL: 88 MS
QT INTERVAL: 316 MS
QTC INTERVAL: 413 MS
T WAVE AXIS: 70 DEGREES
VENTRICULAR RATE: 103 BPM

## 2019-03-04 PROCEDURE — 93010 ELECTROCARDIOGRAM REPORT: CPT | Performed by: INTERNAL MEDICINE

## 2019-03-06 LAB
M PNEUMO IGG SER IA-ACNC: 431 U/ML (ref 0–99)
M PNEUMO IGM SER IA-ACNC: <770 U/ML (ref 0–769)

## 2019-03-07 LAB
BACTERIA BLD CULT: NORMAL
BACTERIA BLD CULT: NORMAL

## 2019-11-16 ENCOUNTER — APPOINTMENT (EMERGENCY)
Dept: CT IMAGING | Facility: HOSPITAL | Age: 59
End: 2019-11-16
Payer: COMMERCIAL

## 2019-11-16 ENCOUNTER — HOSPITAL ENCOUNTER (EMERGENCY)
Facility: HOSPITAL | Age: 59
Discharge: HOME | End: 2019-11-16
Attending: EMERGENCY MEDICINE | Admitting: EMERGENCY MEDICINE
Payer: COMMERCIAL

## 2019-11-16 VITALS
SYSTOLIC BLOOD PRESSURE: 137 MMHG | BODY MASS INDEX: 27.2 KG/M2 | HEART RATE: 94 BPM | TEMPERATURE: 97.4 F | DIASTOLIC BLOOD PRESSURE: 80 MMHG | WEIGHT: 206.13 LBS | OXYGEN SATURATION: 92 % | RESPIRATION RATE: 15 BRPM

## 2019-11-16 DIAGNOSIS — I10 HTN (HYPERTENSION): ICD-10-CM

## 2019-11-16 DIAGNOSIS — K20.90 ESOPHAGITIS: ICD-10-CM

## 2019-11-16 DIAGNOSIS — J43.9 EMPHYSEMA OF LUNG (HCC): ICD-10-CM

## 2019-11-16 DIAGNOSIS — E04.1 THYROID NODULE: ICD-10-CM

## 2019-11-16 DIAGNOSIS — N28.1 RENAL CYST: ICD-10-CM

## 2019-11-16 DIAGNOSIS — E86.0 DEHYDRATION: ICD-10-CM

## 2019-11-16 DIAGNOSIS — R10.9 ABDOMINAL PAIN: Primary | ICD-10-CM

## 2019-11-16 DIAGNOSIS — I77.810 ASCENDING AORTA DILATATION (HCC): ICD-10-CM

## 2019-11-16 LAB
ALBUMIN SERPL BCP-MCNC: 4.5 G/DL (ref 3.5–5)
ALP SERPL-CCNC: 74 U/L (ref 46–116)
ALT SERPL W P-5'-P-CCNC: 41 U/L (ref 12–78)
ANION GAP SERPL CALCULATED.3IONS-SCNC: 9 MMOL/L (ref 4–13)
APTT PPP: 32 SECONDS (ref 23–37)
AST SERPL W P-5'-P-CCNC: 21 U/L (ref 5–45)
BASOPHILS # BLD AUTO: 0.04 THOUSANDS/ΜL (ref 0–0.1)
BASOPHILS NFR BLD AUTO: 0 % (ref 0–1)
BILIRUB SERPL-MCNC: 0.8 MG/DL (ref 0.2–1)
BUN SERPL-MCNC: 16 MG/DL (ref 5–25)
CALCIUM SERPL-MCNC: 9.7 MG/DL (ref 8.3–10.1)
CHLORIDE SERPL-SCNC: 102 MMOL/L (ref 100–108)
CK SERPL-CCNC: 152 U/L (ref 39–308)
CO2 SERPL-SCNC: 25 MMOL/L (ref 21–32)
CREAT SERPL-MCNC: 0.91 MG/DL (ref 0.6–1.3)
EOSINOPHIL # BLD AUTO: 0.23 THOUSAND/ΜL (ref 0–0.61)
EOSINOPHIL NFR BLD AUTO: 2 % (ref 0–6)
ERYTHROCYTE [DISTWIDTH] IN BLOOD BY AUTOMATED COUNT: 12.3 % (ref 11.6–15.1)
GFR SERPL CREATININE-BSD FRML MDRD: 93 ML/MIN/1.73SQ M
GLUCOSE SERPL-MCNC: 119 MG/DL (ref 65–140)
HCT VFR BLD AUTO: 52.2 % (ref 36.5–49.3)
HGB BLD-MCNC: 17.6 G/DL (ref 12–17)
IMM GRANULOCYTES # BLD AUTO: 0.03 THOUSAND/UL (ref 0–0.2)
IMM GRANULOCYTES NFR BLD AUTO: 0 % (ref 0–2)
INR PPP: 0.95 (ref 0.84–1.19)
LACTATE SERPL-SCNC: 1.3 MMOL/L (ref 0.5–2)
LIPASE SERPL-CCNC: 163 U/L (ref 73–393)
LYMPHOCYTES # BLD AUTO: 1.89 THOUSANDS/ΜL (ref 0.6–4.47)
LYMPHOCYTES NFR BLD AUTO: 17 % (ref 14–44)
MAGNESIUM SERPL-MCNC: 1.9 MG/DL (ref 1.6–2.6)
MCH RBC QN AUTO: 30.8 PG (ref 26.8–34.3)
MCHC RBC AUTO-ENTMCNC: 33.7 G/DL (ref 31.4–37.4)
MCV RBC AUTO: 91 FL (ref 82–98)
MONOCYTES # BLD AUTO: 0.76 THOUSAND/ΜL (ref 0.17–1.22)
MONOCYTES NFR BLD AUTO: 7 % (ref 4–12)
NEUTROPHILS # BLD AUTO: 8.52 THOUSANDS/ΜL (ref 1.85–7.62)
NEUTS SEG NFR BLD AUTO: 74 % (ref 43–75)
NRBC BLD AUTO-RTO: 0 /100 WBCS
PLATELET # BLD AUTO: 266 THOUSANDS/UL (ref 149–390)
PMV BLD AUTO: 10.1 FL (ref 8.9–12.7)
POTASSIUM SERPL-SCNC: 4.2 MMOL/L (ref 3.5–5.3)
PROT SERPL-MCNC: 8.3 G/DL (ref 6.4–8.2)
PROTHROMBIN TIME: 12.7 SECONDS (ref 11.6–14.5)
RBC # BLD AUTO: 5.72 MILLION/UL (ref 3.88–5.62)
SODIUM SERPL-SCNC: 136 MMOL/L (ref 136–145)
TROPONIN I SERPL-MCNC: <0.02 NG/ML
WBC # BLD AUTO: 11.47 THOUSAND/UL (ref 4.31–10.16)

## 2019-11-16 PROCEDURE — 84484 ASSAY OF TROPONIN QUANT: CPT | Performed by: EMERGENCY MEDICINE

## 2019-11-16 PROCEDURE — 93005 ELECTROCARDIOGRAM TRACING: CPT

## 2019-11-16 PROCEDURE — 99285 EMERGENCY DEPT VISIT HI MDM: CPT | Performed by: EMERGENCY MEDICINE

## 2019-11-16 PROCEDURE — 36415 COLL VENOUS BLD VENIPUNCTURE: CPT | Performed by: EMERGENCY MEDICINE

## 2019-11-16 PROCEDURE — 85610 PROTHROMBIN TIME: CPT | Performed by: EMERGENCY MEDICINE

## 2019-11-16 PROCEDURE — 74177 CT ABD & PELVIS W/CONTRAST: CPT

## 2019-11-16 PROCEDURE — 82550 ASSAY OF CK (CPK): CPT | Performed by: EMERGENCY MEDICINE

## 2019-11-16 PROCEDURE — 96375 TX/PRO/DX INJ NEW DRUG ADDON: CPT

## 2019-11-16 PROCEDURE — 96374 THER/PROPH/DIAG INJ IV PUSH: CPT

## 2019-11-16 PROCEDURE — 83735 ASSAY OF MAGNESIUM: CPT | Performed by: EMERGENCY MEDICINE

## 2019-11-16 PROCEDURE — 96361 HYDRATE IV INFUSION ADD-ON: CPT

## 2019-11-16 PROCEDURE — 99285 EMERGENCY DEPT VISIT HI MDM: CPT

## 2019-11-16 PROCEDURE — 80053 COMPREHEN METABOLIC PANEL: CPT | Performed by: EMERGENCY MEDICINE

## 2019-11-16 PROCEDURE — 85730 THROMBOPLASTIN TIME PARTIAL: CPT | Performed by: EMERGENCY MEDICINE

## 2019-11-16 PROCEDURE — 71275 CT ANGIOGRAPHY CHEST: CPT

## 2019-11-16 PROCEDURE — 83605 ASSAY OF LACTIC ACID: CPT | Performed by: EMERGENCY MEDICINE

## 2019-11-16 PROCEDURE — 83690 ASSAY OF LIPASE: CPT | Performed by: EMERGENCY MEDICINE

## 2019-11-16 PROCEDURE — 85025 COMPLETE CBC W/AUTO DIFF WBC: CPT | Performed by: EMERGENCY MEDICINE

## 2019-11-16 RX ORDER — FAMOTIDINE 20 MG/1
20 TABLET, FILM COATED ORAL 2 TIMES DAILY
Qty: 28 TABLET | Refills: 0 | Status: SHIPPED | OUTPATIENT
Start: 2019-11-16 | End: 2019-11-30

## 2019-11-16 RX ORDER — SUCRALFATE ORAL 1 G/10ML
1 SUSPENSION ORAL
Qty: 280 ML | Refills: 0 | Status: SHIPPED | OUTPATIENT
Start: 2019-11-16 | End: 2019-11-23

## 2019-11-16 RX ORDER — METOCLOPRAMIDE HYDROCHLORIDE 5 MG/ML
10 INJECTION INTRAMUSCULAR; INTRAVENOUS ONCE
Status: COMPLETED | OUTPATIENT
Start: 2019-11-16 | End: 2019-11-16

## 2019-11-16 RX ORDER — FENTANYL CITRATE 50 UG/ML
50 INJECTION, SOLUTION INTRAMUSCULAR; INTRAVENOUS ONCE
Status: COMPLETED | OUTPATIENT
Start: 2019-11-16 | End: 2019-11-16

## 2019-11-16 RX ORDER — ONDANSETRON 2 MG/ML
4 INJECTION INTRAMUSCULAR; INTRAVENOUS ONCE
Status: COMPLETED | OUTPATIENT
Start: 2019-11-16 | End: 2019-11-16

## 2019-11-16 RX ORDER — DIPHENHYDRAMINE HYDROCHLORIDE 50 MG/ML
25 INJECTION INTRAMUSCULAR; INTRAVENOUS ONCE
Status: COMPLETED | OUTPATIENT
Start: 2019-11-16 | End: 2019-11-16

## 2019-11-16 RX ORDER — SUCRALFATE ORAL 1 G/10ML
1000 SUSPENSION ORAL ONCE
Status: COMPLETED | OUTPATIENT
Start: 2019-11-16 | End: 2019-11-16

## 2019-11-16 RX ADMIN — ONDANSETRON 4 MG: 2 INJECTION INTRAMUSCULAR; INTRAVENOUS at 02:57

## 2019-11-16 RX ADMIN — FAMOTIDINE 20 MG: 10 INJECTION, SOLUTION INTRAVENOUS at 04:30

## 2019-11-16 RX ADMIN — DIPHENHYDRAMINE HYDROCHLORIDE 25 MG: 50 INJECTION, SOLUTION INTRAMUSCULAR; INTRAVENOUS at 03:19

## 2019-11-16 RX ADMIN — METOCLOPRAMIDE 10 MG: 5 INJECTION, SOLUTION INTRAMUSCULAR; INTRAVENOUS at 03:18

## 2019-11-16 RX ADMIN — IOHEXOL 100 ML: 350 INJECTION, SOLUTION INTRAVENOUS at 04:04

## 2019-11-16 RX ADMIN — SODIUM CHLORIDE 1000 ML: 0.9 INJECTION, SOLUTION INTRAVENOUS at 02:55

## 2019-11-16 RX ADMIN — SODIUM CHLORIDE 1000 ML: 0.9 INJECTION, SOLUTION INTRAVENOUS at 04:08

## 2019-11-16 RX ADMIN — MORPHINE SULFATE 2 MG: 2 INJECTION, SOLUTION INTRAMUSCULAR; INTRAVENOUS at 03:20

## 2019-11-16 RX ADMIN — FENTANYL CITRATE 50 MCG: 0.05 INJECTION, SOLUTION INTRAMUSCULAR; INTRAVENOUS at 02:55

## 2019-11-16 RX ADMIN — SUCRALFATE 1000 MG: 1 SUSPENSION ORAL at 04:32

## 2019-11-16 NOTE — ED PROVIDER NOTES
History  Chief Complaint   Patient presents with    Abdominal Pain     Patient started with RUQ abdominal pain around 6pm after a piece of steak got stuck  Patient has vomited several times  Patient is a 60-year-old male with a history of hypertension, COPD, hyperlipidemia and depression coming in for right upper quadrant pain  Patient states that last night he is eating steak and I started to choke on it  I ended up vomiting it up but then I had pain in my right side    Patient initially believes that he has a piece of steak stuck in his right upper quadrant  He reports that the pain is constant but comes in waves in intensity  He describes as a constant pressure pain in the right upper quadrant  He does have associated nausea vomiting  He has no chest pain, shortness of breath, palpitations, diaphoresis  The pain does not radiate and states to the right side  He has no diarrhea, melena or bright red blood per rectum  He has no recent surgeries, travel or sick contacts  He was eating steak with butter noodles    He reports that he had similar symptoms several months ago went to his PCP and then a cardiologist   He was cleared by the cardiologist except for early aortic stenosis      History provided by:  Patient   used: No    Abdominal Pain   Pain location:  RUQ  Pain quality: aching, cramping and pressure    Pain radiates to:  Does not radiate  Pain severity:  Moderate  Timing:  Constant  Progression:  Worsening  Chronicity:  Recurrent  Context: not alcohol use, not awakening from sleep, not diet changes, not eating, not laxative use, not medication withdrawal, not previous surgeries, not recent illness, not recent sexual activity, not recent travel, not retching, not sick contacts, not suspicious food intake and not trauma    Relieved by:  Nothing  Worsened by:  Nothing  Associated symptoms: anorexia, nausea and vomiting    Associated symptoms: no belching, no chest pain, no chills, no constipation, no cough, no diarrhea, no dysuria, no fatigue, no fever, no flatus, no hematemesis, no hematochezia, no hematuria, no melena, no shortness of breath and no sore throat    Risk factors: obesity    Risk factors: no alcohol abuse, no aspirin use, has not had multiple surgeries, no NSAID use and no recent hospitalization        Prior to Admission Medications   Prescriptions Last Dose Informant Patient Reported? Taking? albuterol (PROAIR HFA) 90 mcg/act inhaler 11/15/2019 at Unknown time  Yes Yes   Sig: Inhale 2 puffs   losartan (COZAAR) 50 mg tablet 11/15/2019 at Unknown time  Yes Yes   Sig: Take 50 mg by mouth   methylPREDNISolone 4 MG tablet therapy pack Not Taking at Unknown time  No No   Sig: Use as directed on package   Patient not taking: Reported on 11/16/2019      Facility-Administered Medications: None       Past Medical History:   Diagnosis Date    Asthma     COPD [facility]chronic obstructive pulmonary disease) ([facility] [address] )     Depression     Hyperlipidemia     Hypertension        History reviewed  No pertinent surgical history  History reviewed  No pertinent family history  I have reviewed and agree with the history as documented  Social History     Tobacco Use    Smoking status: Current Every Day Smoker     Packs/day: 0 25    Smokeless tobacco: Never Used   Substance Use Topics    Alcohol use: Yes     Frequency: Monthly or less     Drinks per session: 1 or 2     Binge frequency: Less than monthly     Comment: occasionaly     Drug use: Never        Review of Systems   Constitutional: Negative  Negative for chills, diaphoresis, fatigue and fever  HENT: Negative  Negative for ear pain and sore throat  Eyes: Negative  Negative for visual disturbance  Respiratory: Negative  Negative for cough, chest tightness and shortness of breath  Cardiovascular: Negative for chest pain and palpitations  Gastrointestinal: Positive for abdominal pain, anorexia, nausea and vomiting  Negative for constipation, diarrhea, flatus, hematemesis, hematochezia and melena  Genitourinary: Negative  Negative for difficulty urinating, dysuria and hematuria  Musculoskeletal: Negative  Negative for back pain and neck pain  Skin: Negative for rash  Neurological: Negative  Negative for weakness  Hematological: Negative  Psychiatric/Behavioral: Negative  Negative for confusion  All other systems reviewed and are negative  Physical Exam  Physical Exam   Constitutional: He is oriented to person, place, and time  He appears well-developed and well-nourished  He appears distressed  Patient appears in pain   HENT:   Head: Normocephalic and atraumatic  Mouth/Throat: Oropharynx is clear and moist    Patient maintaining airway maintaining secretions  Uvula midline without edema  No brawniness under the tongue  Eyes: Pupils are equal, round, and reactive to light  Conjunctivae and EOM are normal    Neck: Normal range of motion  Neck supple  Cardiovascular: Normal rate, regular rhythm, normal heart sounds and intact distal pulses  No murmur heard  Pulses:       Radial pulses are 2+ on the right side, and 2+ on the left side  Dorsalis pedis pulses are 2+ on the right side, and 2+ on the left side  Pulmonary/Chest: Effort normal and breath sounds normal  No stridor  No respiratory distress  Abdominal: Soft  Bowel sounds are normal  He exhibits no distension  There is no hepatosplenomegaly  There is tenderness in the right upper quadrant and epigastric area  There is positive Sutton's sign  There is no rigidity, no rebound, no guarding, no CVA tenderness and no tenderness at McBurney's point  Musculoskeletal: Normal range of motion  He exhibits no edema  Neurological: He is alert and oriented to person, place, and time  No cranial nerve deficit  GCS eye subscore is 4  GCS verbal subscore is 5  GCS motor subscore is 6  No slurred speech  No facial asymmetry    No ataxia Skin: Skin is warm  Capillary refill takes less than 2 seconds  He is not diaphoretic  Psychiatric: He has a normal mood and affect  Nursing note and vitals reviewed        Vital Signs  ED Triage Vitals [11/16/19 0236]   Temperature Pulse Respirations Blood Pressure SpO2   (!) 97 4 °F (36 3 °C) 80 20 147/89 97 %      Temp Source Heart Rate Source Patient Position - Orthostatic VS BP Location FiO2 (%)   Temporal Monitor Sitting Left arm --      Pain Score       7           Vitals:    11/16/19 0236 11/16/19 0311 11/16/19 0330 11/16/19 0415   BP: 147/89 146/77 (!) 155/108 137/80   Pulse: 80 93 92 94   Patient Position - Orthostatic VS: Sitting Sitting Sitting Sitting         Visual Acuity      ED Medications  Medications   sodium chloride 0 9 % bolus 1,000 mL (1,000 mL Intravenous New Bag 11/16/19 0408)   fentanyl citrate (PF) 100 MCG/2ML 50 mcg (50 mcg Intravenous Given 11/16/19 0255)   ondansetron (ZOFRAN) injection 4 mg (4 mg Intravenous Given 11/16/19 0257)   sodium chloride 0 9 % bolus 1,000 mL (0 mL Intravenous Stopped 11/16/19 0406)   metoclopramide (REGLAN) injection 10 mg (10 mg Intravenous Given 11/16/19 0318)   diphenhydrAMINE (BENADRYL) injection 25 mg (25 mg Intravenous Given 11/16/19 0319)   morphine injection 2 mg (2 mg Intravenous Given 11/16/19 0320)   iohexol (OMNIPAQUE) 350 MG/ML injection (SINGLE-DOSE) 100 mL (100 mL Intravenous Given 11/16/19 0404)   sucralfate (CARAFATE) oral suspension 1,000 mg (1,000 mg Oral Given 11/16/19 0432)   famotidine (PEPCID) injection 20 mg (20 mg Intravenous Given 11/16/19 0430)       Diagnostic Studies  Results Reviewed     Procedure Component Value Units Date/Time    Magnesium [[id number]]  (Normal) Collected:  11/16/19 0246    Lab Status:  Final result Specimen:  Blood from Arm, Right Updated:  11/16/19 0328     Magnesium 1 9 mg/dL     Lipase [[id number]]  (Normal) Collected:  11/16/19 0246    Lab Status:  Final result Specimen:  Blood from Arm, Right Updated: 11/16/19 0328     Lipase 163 u/L     CK Total with Reflex CKMB [[id number]]  (Normal) Collected:  11/16/19 0246    Lab Status:  Final result Specimen:  Blood from Arm, Right Updated:  11/16/19 0327     Total  U/L     Troponin I [[id number]]  (Normal) Collected:  11/16/19 0246    Lab Status:  Final result Specimen:  Blood from Arm, Right Updated:  11/16/19 0313     Troponin I <0 02 ng/mL     Lactic acid, plasma [[id number]]  (Normal) Collected:  11/16/19 0246    Lab Status:  Final result Specimen:  Blood from Arm, Right Updated:  11/16/19 0313     LACTIC ACID 1 3 mmol/L     Narrative:       Result may be elevated if tourniquet was used during collection      Comprehensive metabolic panel [[id number]]  (Abnormal) Collected:  11/16/19 0246    Lab Status:  Final result Specimen:  Blood from Arm, Right Updated:  11/16/19 0310     Sodium 136 mmol/L      Potassium 4 2 mmol/L      Chloride 102 mmol/L      CO2 25 mmol/L      ANION GAP 9 mmol/L      BUN 16 mg/dL      Creatinine 0 91 mg/dL      Glucose 119 mg/dL      Calcium 9 7 mg/dL      AST 21 U/L      ALT 41 U/L      Alkaline Phosphatase 74 U/L      Total Protein 8 3 g/dL      Albumin 4 5 g/dL      Total Bilirubin 0 80 mg/dL      eGFR 93 ml/min/1 73sq m     Narrative:       [place] guidelines for Chronic Kidney Disease (CKD):     Stage 1 with normal or high GFR (GFR > 90 mL/min/1 73 square meters)    Stage 2 Mild CKD (GFR = 60-89 mL/min/1 73 square meters)    Stage 3A Moderate CKD (GFR = 45-59 mL/min/1 73 square meters)    Stage 3B Moderate CKD (GFR = 30-44 mL/min/1 73 square meters)    Stage 4 Severe CKD (GFR = 15-29 mL/min/1 73 square meters)    Stage 5 End Stage CKD (GFR <15 mL/min/1 73 square meters)  Note: GFR calculation is accurate only with a steady state creatinine    Protime-INR [[id number]]  (Normal) Collected:  11/16/19 0246    Lab Status:  Final result Specimen:  Blood from Arm, Right Updated:  11/16/19 0304     Protime 12 7 seconds      INR 0 95    APTT [[id number]]  (Normal) Collected:  11/16/19 0246    Lab Status:  Final result Specimen:  Blood from Arm, Right Updated:  11/16/19 0304     PTT 32 seconds     CBC and differential [[id number]]  (Abnormal) Collected:  11/16/19 0246    Lab Status:  Final result Specimen:  Blood from Arm, Right Updated:  11/16/19 0254     WBC 11 47 Thousand/uL      RBC 5 72 Million/uL      Hemoglobin 17 6 g/dL      Hematocrit 52 2 %      MCV 91 fL      MCH 30 8 pg      MCHC 33 7 g/dL      RDW 12 3 %      MPV 10 1 fL      Platelets 665 Thousands/uL      nRBC 0 /100 WBCs      Neutrophils Relative 74 %      Immat GRANS % 0 %      Lymphocytes Relative 17 %      Monocytes Relative 7 %      Eosinophils Relative 2 %      Basophils Relative 0 %      Neutrophils Absolute 8 52 Thousands/µL      Immature Grans Absolute 0 03 Thousand/uL      Lymphocytes Absolute 1 89 Thousands/µL      Monocytes Absolute 0 76 Thousand/µL      Eosinophils Absolute 0 23 Thousand/µL      Basophils Absolute 0 04 Thousands/µL                  CT pe study w abdomen pelvis w contrast   Final Result by Darrick New DO (11/16 0421)      Diffuse thickening and fluid-filled esophagus which may represent esophagitis  Recommend follow-up with gastroenterology to rule out underlying neoplasm  Tree-in-bud airspace opacities within the left upper lobe  Findings may be seen in setting of infectious versus inflammatory causes  Follow-up to resolution is recommended  Thyroid is markedly enlarged                     Workstation performed: [id number]                    Procedures  Procedures       ED Course  ED Course as of Nov 16 0457   Sat Nov 16, 2019   0241 Patient is a 52-year-old male coming in today with abdominal pain that started last night around five a m  Six o'clock  On exam patient appears in pain and discomfort with reproducible right upper quadrant pain  He is nontoxic appearing and hemodynamically stable    Will start with labs, pain med control antiemetics as well as CT  Portions of the record may have been created with voice recognition software  Occasional wrong word or "sound a like" substitutions may have occurred due to the inherent limitations of voice recognition software  Read the chart carefully and recognize, using context, where substitutions have occurred  7324 Patient's labs are stable  No evidence of end-organ damage  Heart score 3 with negative troponin  Patient continues to have pain despite Bentyl and Zofran  Will give morphine Reglan and Benadryl and changed to CT PE       0344 Lipase stable CK stable  Other labs are stable      0433 CT resulted with no acute pathology except for soft dried is  Patient does have changes relating to emphysematous as well as renal cyst another incidental which she is aware of  Will give a copy of his CT  He is given Pepcid now as well as Carafate  He is updated on this  2632 Patient tolerated Carafate without any vomiting  Long discussion with patient regarding soft diet follow-up and return to ER instructions    Copy of CT was given to patient            HEART Risk Score      Most Recent Value   History  0 Filed at: 11/16/2019 0314   ECG  0 Filed at: 11/16/2019 0314   Age  1 Filed at: 11/16/2019 0314   Risk Factors  2 Filed at: 11/16/2019 0314   Troponin  0 Filed at: 11/16/2019 0314   Heart Score Risk Calculator   History  0 Filed at: 11/16/2019 0314   ECG  0 Filed at: 11/16/2019 0314   Age  1 Filed at: 11/16/2019 0314   Risk Factors  2 Filed at: 11/16/2019 0314   Troponin  0 Filed at: 11/16/2019 0314   HEART Score  3 Filed at: 11/16/2019 0314   HEART Score  3 Filed at: 11/16/2019 0314            PERC Rule for PE      Most Recent Value   PERC Rule for PE   Age >=50  1 Filed at: 11/16/2019 0314   HR >=100  0 Filed at: 11/16/2019 0314   O2 Sat on room air < 95%  0 Filed at: 11/16/2019 0314   History of PE or DVT  0 Filed at: 11/16/2019 2426   Recent trauma or surgery  0 Filed at: 11/16/2019 0314   Hemoptysis  0 Filed at: 11/16/2019 0314   Exogenous estrogen  0 Filed at: 11/16/2019 0314   Unilateral leg swelling  0 Filed at: 11/16/2019 0314   PERC Rule for PE Results  1 Filed at: 11/16/2019 2429                Wells' Criteria for PE      Most Recent Value   Wells' Criteria for PE   Clinical signs and symptoms of DVT  0 Filed at: 11/16/2019 5228   PE is primary diagnosis or equally likely  0 Filed at: 11/16/2019 0314   HR >100  0 Filed at: 11/16/2019 0314   Immobilization at least 3 days or Surgery in the previous 4 weeks  0 Filed at: 11/16/2019 0835   Previous, objectively diagnosed PE or DVT  0 Filed at: 11/16/2019 0314   Hemoptysis  0 Filed at: 11/16/2019 0250   Malignancy with treatment within 6 months or palliative  0 Filed at: 11/16/2019 3912   Wells' Criteria Total  0 Filed at: 11/16/2019 7979            MDM  Number of Diagnoses or Management Options  Abdominal pain:   Dehydration:   Diagnosis management comments:   Differential diagnosis includes but not limited to:  AAA, mesenteric ischemia, pancreatitis, cholecystitis, choledocholithiasis, hepatitis, bowel obstruction, ileus, gastroenteritis, colitis, malignancy, AAA, perforation, toxicologic poisoning, renal infarct, acute kidney injury, splenic infarct, splenic injury, nephrolithiasis, UTI, muscular strain, intra-abdominal hematoma    EKG INTERPRETATION at 2:41 a m  RHYTHM:  Sinus tachy at 100 beats per minute  AXIS:  Normal axis  INTERVALS:  Pr interval measured at 162 milliseconds  QRS COMPLEX:  QRS measured at 92 milliseconds  ST SEGMENT:  Nonspecific ST segment changes  Diffuse artifact  QT INTERVAL:  QTC measured at 434 milliseconds  COMPARED WITH PRIOR   Caren Sequin   Interpretation by Dangelo Pastor, DO         Amount and/or Complexity of Data Reviewed  Clinical lab tests: ordered and reviewed  Tests in the radiology section of CPT®: ordered and reviewed  Tests in the medicine section of CPT®: ordered and reviewed  Independent visualization of images, tracings, or specimens: yes        Disposition  Final diagnoses:   Abdominal pain   Dehydration   Esophagitis   Renal cyst   Thyroid nodule   Ascending aorta dilatation (HCC)   Emphysema of lung ([facility] [address] )   HTN (hypertension)     Time reflects when diagnosis was documented in both MDM as applicable and the Disposition within this note     Time User Action Codes Description Comment    11/16/2019  4:04 AM Bendock, Versie Kite L Add [R10 9] Abdominal pain     11/16/2019  4:04 AM Bendock, Domi L Add [E86 0] Dehydration     11/16/2019  4:36 AM Bendock, Versie Kite L Add [K20 9] Esophagitis     11/16/2019  4:37 AM Bendock, Versie Kite L Add [N28 1] Renal cyst     11/16/2019  4:37 AM Bendock, Versie Kite L Add [E04 1] Thyroid nodule     11/16/2019  4:37 AM BendSymone dixonyn Andrew Add [I77 810] Ascending aorta dilatation ([facility] )     11/16/2019  4:37 AM Bendock, Versie Kite L Add [J43 9] Emphysema of lung ([facility] )     11/16/2019  4:49 AM Bendock, Versie Kite L Add [I10] HTN (hypertension)       ED Disposition     ED Disposition Condition Date/Time Comment    Discharge Stable Sat Nov 16, 2019  4:49 AM Douglas Marroquin discharge to home/self care  Follow-up Information    None         Patient's Medications   Discharge Prescriptions    FAMOTIDINE (PEPCID) 20 MG TABLET    Take 1 tablet (20 mg total) by mouth 2 (two) times a day for 14 days       Start Date: 11/16/2019End Date: 11/30/2019       Order Dose: 20 mg       Quantity: 28 tablet    Refills: 0    SUCRALFATE (CARAFATE) 1 G/10 ML SUSPENSION    Take 10 mL (1 g total) by mouth 4 (four) times a day (with meals and at bedtime) for 7 days       Start Date: 11/16/2019End Date: 11/23/2019       Order Dose: 1 g       Quantity: 280 mL    Refills: 0     No discharge procedures on file      ED Provider  Electronically Signed by           Cory Skiff, DO  11/16/19 9939

## 2019-11-19 LAB
ATRIAL RATE: 97 BPM
P AXIS: 76 DEGREES
PR INTERVAL: 162 MS
QRS AXIS: 80 DEGREES
QRSD INTERVAL: 92 MS
QT INTERVAL: 342 MS
QTC INTERVAL: 434 MS
T WAVE AXIS: 55 DEGREES
VENTRICULAR RATE: 97 BPM

## 2019-11-19 PROCEDURE — 93010 ELECTROCARDIOGRAM REPORT: CPT | Performed by: INTERNAL MEDICINE

## 2021-10-23 ENCOUNTER — OFFICE VISIT (OUTPATIENT)
Dept: URGENT CARE | Facility: CLINIC | Age: 61
End: 2021-10-23
Payer: COMMERCIAL

## 2021-10-23 VITALS
HEIGHT: 72 IN | RESPIRATION RATE: 18 BRPM | OXYGEN SATURATION: 96 % | HEART RATE: 83 BPM | BODY MASS INDEX: 26.41 KG/M2 | TEMPERATURE: 98.6 F | DIASTOLIC BLOOD PRESSURE: 83 MMHG | SYSTOLIC BLOOD PRESSURE: 117 MMHG | WEIGHT: 195 LBS

## 2021-10-23 DIAGNOSIS — H00.12 CHALAZION OF RIGHT LOWER EYELID: Primary | ICD-10-CM

## 2021-10-23 PROCEDURE — 99203 OFFICE O/P NEW LOW 30 MIN: CPT | Performed by: FAMILY MEDICINE

## 2021-10-23 PROCEDURE — S9088 SERVICES PROVIDED IN URGENT: HCPCS | Performed by: FAMILY MEDICINE

## 2021-10-23 RX ORDER — CEPHALEXIN 500 MG/1
500 CAPSULE ORAL 3 TIMES DAILY
Qty: 21 CAPSULE | Refills: 0 | Status: SHIPPED | OUTPATIENT
Start: 2021-10-23 | End: 2021-10-30

## 2021-10-23 RX ORDER — GENTAMICIN SULFATE 3 MG/ML
1 SOLUTION/ DROPS OPHTHALMIC 4 TIMES DAILY
Qty: 5 ML | Refills: 0 | Status: SHIPPED | OUTPATIENT
Start: 2021-10-23 | End: 2021-10-26

## 2021-10-26 ENCOUNTER — TELEPHONE (OUTPATIENT)
Dept: URGENT CARE | Facility: CLINIC | Age: 61
End: 2021-10-26

## 2021-10-26 DIAGNOSIS — H00.16 CHALAZION OF LEFT EYE, UNSPECIFIED EYELID: Primary | ICD-10-CM

## 2021-10-26 RX ORDER — TOBRAMYCIN 3 MG/ML
1 SOLUTION/ DROPS OPHTHALMIC
Qty: 5 ML | Refills: 0 | Status: SHIPPED | OUTPATIENT
Start: 2021-10-26